# Patient Record
(demographics unavailable — no encounter records)

---

## 2025-05-28 NOTE — ASSESSMENT
[FreeTextEntry1] : Vascular surgeon discussed with the patient: Varicose veins are enlarged, twisted veins. Varicose veins are caused by increased blood pressure in the veins.  The blood moves towards the heart by 1-way valves in the veins. When the valves become weakened or damaged, blood can collect in the veins. This causes the veins to become enlarged. Sitting or standing for long periods can cause blood to pool in the leg veins, increasing the pressure within the veins. The veins can stretch from the increased pressure. This may weaken the walls of the veins and damage the valves. Varicose veins may be more common in some families (inherited).  Factors that may increase pressure include:  obesity, older age, being female, pregnancy, taking oral contraceptive pills or hormone replacement, being inactive, and/or smoking.  The most common symptoms of varicose veins are sensations in the legs, such as a heavy feeling, burning, and/or aching. However, each individual may experience symptoms differently.  Other symptoms may include:  color changes in the skin, sores on the legs, or rash.  Severe varicose veins may eventually produce long-term mild swelling that can result in more serious skin and tissue problems, such as ulcers and non-healing sores. Varicose veins are diagnosed by a complete medical history, physical examination, and diagnostic studies for varicose veins including duplex ultrasound and color-flow imaging.   Medical treatment for varicose veins include:  compression stockings, sclerotherapy, RFA, endovenous laser ablation and/or surgical treatment microphlebectomy.    rt gsv rfa left gsv rfa rt lsv rfa Rx for daily use thigh high support hose 20-30 mmHg may need stab phlebectomy and sclerotherapy [Other: _____] : [unfilled]

## 2025-05-28 NOTE — PHYSICAL EXAM
[2+] : left 2+ [Ankle Swelling (On Exam)] : present [Ankle Swelling Bilaterally] : bilaterally  [Varicose Veins Of Lower Extremities] : bilaterally [] : bilaterally [Ankle Swelling On The Left] : moderate [No Rash or Lesion] : No rash or lesion [Purpura] : no purpura  [Petechiae] : no petechiae [Skin Ulcer] : no ulcer [Skin Induration] : no induration [Alert] : alert [Oriented to Person] : oriented to person [Oriented to Place] : oriented to place [Oriented to Time] : oriented to time [Calm] : calm [de-identified] : wdwn nad [FreeTextEntry1] : b/ legs scattered surface varicose veins, no venous ulcers [de-identified] : wnl [de-identified] : as above

## 2025-05-28 NOTE — DATA REVIEWED
[FreeTextEntry1] : i reviewed b/l vle with rvt - b/l gsv and rt lsv reflux, no dvt, no svt , b/l deep reflux

## 2025-05-28 NOTE — REASON FOR VISIT
[Initial Evaluation] : an initial evaluation [FreeTextEntry1] : pt here with concern about b/l leg worsening CVI sx of ache, heaviness, cramps, swelling, fatigue, tenderness, large varicose veins - b/l vle withr vt shows rt gsv reflux, rt lsv reflux, left gsv reflux, b/l deep reflux, no dvt, no svt - pt giev Rx for daily use thigh high support hose 20-30 mmHg and will return for rt gsv rfa, left gsv rfa, rt lsv rfa for treatment of b/l CEAP 3 CVI. May also require stab phlebectomy and sclerotherapy.